# Patient Record
Sex: MALE | Race: WHITE | NOT HISPANIC OR LATINO | Employment: FULL TIME | URBAN - METROPOLITAN AREA
[De-identification: names, ages, dates, MRNs, and addresses within clinical notes are randomized per-mention and may not be internally consistent; named-entity substitution may affect disease eponyms.]

---

## 2024-05-03 ENCOUNTER — OFFICE VISIT (OUTPATIENT)
Dept: INTERNAL MEDICINE | Facility: CLINIC | Age: 29
End: 2024-05-03
Payer: COMMERCIAL

## 2024-05-03 ENCOUNTER — LAB (OUTPATIENT)
Dept: LAB | Facility: HOSPITAL | Age: 29
End: 2024-05-03
Payer: COMMERCIAL

## 2024-05-03 VITALS
OXYGEN SATURATION: 98 % | HEART RATE: 74 BPM | SYSTOLIC BLOOD PRESSURE: 122 MMHG | HEIGHT: 73 IN | DIASTOLIC BLOOD PRESSURE: 78 MMHG | TEMPERATURE: 97.5 F | BODY MASS INDEX: 22.13 KG/M2 | WEIGHT: 167 LBS

## 2024-05-03 DIAGNOSIS — Z11.59 NEED FOR HEPATITIS C SCREENING TEST: ICD-10-CM

## 2024-05-03 DIAGNOSIS — Z13.0 SCREENING FOR ENDOCRINE, NUTRITIONAL, METABOLIC AND IMMUNITY DISORDER: ICD-10-CM

## 2024-05-03 DIAGNOSIS — Z76.89 ENCOUNTER TO ESTABLISH CARE WITH NEW DOCTOR: ICD-10-CM

## 2024-05-03 DIAGNOSIS — Z13.228 SCREENING FOR ENDOCRINE, NUTRITIONAL, METABOLIC AND IMMUNITY DISORDER: ICD-10-CM

## 2024-05-03 DIAGNOSIS — Z13.220 SCREENING, LIPID: ICD-10-CM

## 2024-05-03 DIAGNOSIS — Z13.21 SCREENING FOR ENDOCRINE, NUTRITIONAL, METABOLIC AND IMMUNITY DISORDER: ICD-10-CM

## 2024-05-03 DIAGNOSIS — Z00.00 WELLNESS EXAMINATION: Primary | ICD-10-CM

## 2024-05-03 DIAGNOSIS — Z13.29 SCREENING FOR ENDOCRINE, NUTRITIONAL, METABOLIC AND IMMUNITY DISORDER: ICD-10-CM

## 2024-05-03 PROBLEM — J30.2 SEASONAL ALLERGIES: Status: ACTIVE | Noted: 2024-05-03

## 2024-05-03 LAB
DEPRECATED RDW RBC AUTO: 41.6 FL (ref 37–54)
ERYTHROCYTE [DISTWIDTH] IN BLOOD BY AUTOMATED COUNT: 12.2 % (ref 12.3–15.4)
HCT VFR BLD AUTO: 43.9 % (ref 37.5–51)
HGB BLD-MCNC: 15.1 G/DL (ref 13–17.7)
MCH RBC QN AUTO: 31.7 PG (ref 26.6–33)
MCHC RBC AUTO-ENTMCNC: 34.4 G/DL (ref 31.5–35.7)
MCV RBC AUTO: 92.2 FL (ref 79–97)
PLATELET # BLD AUTO: 221 10*3/MM3 (ref 140–450)
PMV BLD AUTO: 9.6 FL (ref 6–12)
RBC # BLD AUTO: 4.76 10*6/MM3 (ref 4.14–5.8)
WBC NRBC COR # BLD AUTO: 9.07 10*3/MM3 (ref 3.4–10.8)

## 2024-05-03 PROCEDURE — 80053 COMPREHEN METABOLIC PANEL: CPT

## 2024-05-03 PROCEDURE — 80061 LIPID PANEL: CPT

## 2024-05-03 PROCEDURE — 84443 ASSAY THYROID STIM HORMONE: CPT

## 2024-05-03 PROCEDURE — 85027 COMPLETE CBC AUTOMATED: CPT

## 2024-05-03 PROCEDURE — 86803 HEPATITIS C AB TEST: CPT

## 2024-05-03 RX ORDER — CETIRIZINE HYDROCHLORIDE 10 MG/1
10 TABLET ORAL DAILY
COMMUNITY

## 2024-05-03 NOTE — PROGRESS NOTES
Office Note     Name: Malik Ornelas    : 1995     MRN: [id number]     Chief Complaint  Annual Exam and Establish Care    Subjective     History of Present Illness:  Malik Ornelas is a 28 y.o. male who presents today to establish care, physical   Is currently fasting   No issues or concerns     PMH-  Season allergies     [facility]-  Zyrtec     Family Hx-   Maternal GM- - COPD- smoker, stroke   Maternal GF- - unsure   Paternal GM- alive- unsure   Paternal GF- - unsure   Mother- alive- healthy   Father- alive- smoker   Half-Sister- alive   Half- Sister- alive   Half- Brother- alive     Alcohol - 4-5 per week   Smoking - none   Drug use - none   Job -    Stress - 5/10    Sun Exposure - protects skin- no dermatology     Dental/Eye exams - will schedule. Vision is stable. - no glasses or contacts   Diet/Exercise- Exercise- stays active.   Diet- Healthy- moderate. Salty- snacks     Colonoscopy/Cologuard - not. Will start at 47.     Immunizations  Tdap- will hold - will check record   Flu- postponed   COVID- completed no recent booster     Review of Systems:   Review of Systems   Constitutional:  Negative for chills and fever.   HENT:  Negative for dental problem, trouble swallowing and voice change.    Eyes:  Negative for visual disturbance.   Respiratory:  Negative for cough, chest tightness, shortness of breath and wheezing.    Cardiovascular:  Negative for chest pain, palpitations and leg swelling.   Gastrointestinal:  Negative for abdominal pain, constipation, diarrhea, nausea, vomiting and GERD.   Genitourinary:  Negative for dysuria.   Musculoskeletal:  Negative for gait problem.   Skin:  Negative for rash.   Neurological:  Negative for dizziness, seizures, syncope, speech difficulty, weakness, light-headedness and headache.   Psychiatric/Behavioral:  Negative for dysphoric mood.        Past Medical History: History reviewed. No pertinent past medical  "history.    Past Surgical History: History reviewed. No pertinent surgical history.    Immunizations:   Immunization History   Administered Date(s) Administered    COVID-19 (PFIZER) Purple Cap Monovalent 05/19/2021, 06/21/2021        Medications:     Current Outpatient Medications:     cetirizine (zyrTEC) 10 MG tablet, Take 1 tablet by mouth Daily., Disp: , Rfl:     Allergies:   No Known Allergies    Family History:   Family History   Problem Relation Age of Onset    COPD Maternal Grandmother         smoker    Stroke Maternal Grandmother        Social History:   Social History     Socioeconomic History    Marital status: Single   Tobacco Use    Smoking status: Never    Smokeless tobacco: Never   Vaping Use    Vaping status: Never Used   Substance and Sexual Activity    Alcohol use: Yes     Alcohol/week: 4.0 - 5.0 standard drinks of alcohol     Types: 4 - 5 Standard drinks or equivalent per week    Drug use: Never    Sexual activity: Yes         Objective     Vital Signs  /78   Pulse 74   Temp 97.5 °F (36.4 °C)   Ht 185.4 cm (73\")   Wt 75.8 kg (167 lb)   SpO2 98%   BMI 22.03 kg/m²   Estimated body mass index is 22.03 kg/m² as calculated from the following:    Height as of this encounter: 185.4 cm (73\").    Weight as of this encounter: 75.8 kg (167 lb).    BMI is within normal parameters. No other follow-up for BMI required.      Physical Exam  Vitals and nursing note reviewed.   Constitutional:       General: He is not in acute distress.     Appearance: Normal appearance.   HENT:      Head: Normocephalic and atraumatic.      Right Ear: Tympanic membrane normal.      Left Ear: Tympanic membrane normal.      Nose: Nose normal.      Mouth/Throat:      Mouth: Mucous membranes are moist.   Eyes:      Extraocular Movements: Extraocular movements intact.      Conjunctiva/sclera: Conjunctivae normal.      Pupils: Pupils are equal, round, and reactive to light.   Cardiovascular:      Rate and Rhythm: Normal rate " and regular rhythm.      Heart sounds: Normal heart sounds.   Pulmonary:      Effort: Pulmonary effort is normal. No respiratory distress.      Breath sounds: Normal breath sounds.   Abdominal:      General: Bowel sounds are normal.      Palpations: Abdomen is soft.   Musculoskeletal:         General: Normal range of motion.      Cervical back: Normal range of motion.      Comments: Moving all extremities    Skin:     General: Skin is warm and dry.   Neurological:      General: No focal deficit present.      Mental Status: He is alert and oriented to person, place, and time.   Psychiatric:         Mood and Affect: Mood normal.         Behavior: Behavior normal.         Thought Content: Thought content normal.         Judgment: Judgment normal.          Procedures     Assessment and Plan   Diagnosis Discussed   Continue to monitor   Plenty of fluids, monitor diet and exercise   Labs ordered will notify of results   Immunizations up to date -will check record for Tdap  Take medications as instructed  Follow up as directed   If symptoms worsen or persist please seek further evaluation     1. Wellness examination    2. Encounter to establish care with new doctor  Reviewed PMH, medications and specialists     3. Screening for endocrine, nutritional, metabolic and immunity disorder  - CBC (No Diff); Future  - Comprehensive Metabolic Panel; Future  - TSH Rfx On Abnormal To Free T4; Future    4. Screening, lipid  - Lipid Panel; Future    5. Need for hepatitis C screening test  - Hepatitis C Antibody; Future       Follow Up  Return in about 1 year (around 5/3/2025), or if symptoms worsen or fail to improve, for Annual, fasting labs.    Laxmi Zimmer MD  [facility] INTERNAL MEDICINE  [address] [facility] [address] [phone]

## 2024-05-03 NOTE — PATIENT INSTRUCTIONS
Diagnosis Discussed   Continue to monitor   Plenty of fluids, monitor diet and exercise   Labs ordered will notify of results   Immunizations up to date -will check record for Tdap  Take medications as instructed  Follow up as directed   If symptoms worsen or persist please seek further evaluation

## 2024-05-04 LAB
ALBUMIN SERPL-MCNC: 4.8 G/DL (ref 3.5–5.2)
ALBUMIN/GLOB SERPL: 1.9 G/DL
ALP SERPL-CCNC: 61 U/L (ref 39–117)
ALT SERPL W P-5'-P-CCNC: 23 U/L (ref 1–41)
ANION GAP SERPL CALCULATED.3IONS-SCNC: 10 MMOL/L (ref 5–15)
AST SERPL-CCNC: 22 U/L (ref 1–40)
BILIRUB SERPL-MCNC: 0.6 MG/DL (ref 0–1.2)
BUN SERPL-MCNC: 12 MG/DL (ref 6–20)
BUN/CREAT SERPL: 13 (ref 7–25)
CALCIUM SPEC-SCNC: 9.4 MG/DL (ref 8.6–10.5)
CHLORIDE SERPL-SCNC: 106 MMOL/L (ref 98–107)
CHOLEST SERPL-MCNC: 203 MG/DL (ref 0–200)
CO2 SERPL-SCNC: 26 MMOL/L (ref 22–29)
CREAT SERPL-MCNC: 0.92 MG/DL (ref 0.76–1.27)
EGFRCR SERPLBLD CKD-EPI 2021: 117.7 ML/MIN/1.73
GLOBULIN UR ELPH-MCNC: 2.5 GM/DL
GLUCOSE SERPL-MCNC: 90 MG/DL (ref 65–99)
HCV AB SER QL: NORMAL
HDLC SERPL-MCNC: 54 MG/DL (ref 40–60)
LDLC SERPL CALC-MCNC: 130 MG/DL (ref 0–100)
LDLC/HDLC SERPL: 2.36 {RATIO}
POTASSIUM SERPL-SCNC: 4.4 MMOL/L (ref 3.5–5.2)
PROT SERPL-MCNC: 7.3 G/DL (ref 6–8.5)
SODIUM SERPL-SCNC: 142 MMOL/L (ref 136–145)
TRIGL SERPL-MCNC: 107 MG/DL (ref 0–150)
TSH SERPL DL<=0.05 MIU/L-ACNC: 1.02 UIU/ML (ref 0.27–4.2)
VLDLC SERPL-MCNC: 19 MG/DL (ref 5–40)

## 2024-05-06 ENCOUNTER — TELEPHONE (OUTPATIENT)
Dept: INTERNAL MEDICINE | Facility: CLINIC | Age: 29
End: 2024-05-06
Payer: COMMERCIAL

## 2024-05-06 PROBLEM — E78.2 MIXED HYPERLIPIDEMIA: Status: ACTIVE | Noted: 2024-05-06

## 2024-05-07 ENCOUNTER — TELEPHONE (OUTPATIENT)
Dept: INTERNAL MEDICINE | Facility: CLINIC | Age: 29
End: 2024-05-07
Payer: COMMERCIAL

## 2024-05-10 ENCOUNTER — PATIENT ROUNDING (BHMG ONLY) (OUTPATIENT)
Dept: INTERNAL MEDICINE | Facility: CLINIC | Age: 29
End: 2024-05-10
Payer: COMMERCIAL

## 2024-12-27 ENCOUNTER — OFFICE VISIT (OUTPATIENT)
Dept: INTERNAL MEDICINE | Facility: CLINIC | Age: 29
End: 2024-12-27
Payer: COMMERCIAL

## 2024-12-27 VITALS
OXYGEN SATURATION: 100 % | HEART RATE: 80 BPM | WEIGHT: 178 LBS | TEMPERATURE: 97.5 F | HEIGHT: 73 IN | BODY MASS INDEX: 23.59 KG/M2 | RESPIRATION RATE: 16 BRPM | DIASTOLIC BLOOD PRESSURE: 80 MMHG | SYSTOLIC BLOOD PRESSURE: 118 MMHG

## 2024-12-27 DIAGNOSIS — U07.1 COVID-19 VIRUS INFECTION: Primary | ICD-10-CM

## 2024-12-27 DIAGNOSIS — R05.1 ACUTE COUGH: ICD-10-CM

## 2024-12-27 LAB
EXPIRATION DATE: ABNORMAL
FLUAV AG UPPER RESP QL IA.RAPID: NOT DETECTED
FLUBV AG UPPER RESP QL IA.RAPID: NOT DETECTED
INTERNAL CONTROL: ABNORMAL
Lab: ABNORMAL
SARS-COV-2 AG UPPER RESP QL IA.RAPID: DETECTED

## 2024-12-27 PROCEDURE — 99213 OFFICE O/P EST LOW 20 MIN: CPT | Performed by: FAMILY MEDICINE

## 2024-12-27 PROCEDURE — 87428 SARSCOV & INF VIR A&B AG IA: CPT | Performed by: FAMILY MEDICINE

## 2024-12-27 NOTE — PROGRESS NOTES
Office Note     Name: Malik Ornelas    : 1995     MRN: [id number]     Chief Complaint  Exposure To Known Illness (Pt states exposed to covid on 26)    Subjective     History of Present Illness:  Malik Ornelas is a 29 y.o. male who presents today for exposed to COVID on      Sore throat today   Chest tightness yesterday     Denies fevers, chills, abdominal pain, diarrhea, constipation, nausea or vomiting   Dry cough- non productive  Mild sob     OTC- nothing     Review of Systems:   Review of Systems   Constitutional:  Negative for chills and fever.   HENT:  Positive for postnasal drip and sore throat. Negative for congestion, trouble swallowing and voice change.    Respiratory:  Positive for chest tightness. Negative for cough, shortness of breath and wheezing.    Cardiovascular:  Negative for chest pain, palpitations and leg swelling.   Gastrointestinal:  Negative for abdominal pain, constipation, diarrhea, nausea and vomiting.   Genitourinary:  Negative for dysuria.   Musculoskeletal:  Negative for gait problem.   Neurological:  Negative for light-headedness and headache.       Past Medical History: History reviewed. No pertinent past medical history.    Past Surgical History: History reviewed. No pertinent surgical history.    Immunizations:   Immunization History   Administered Date(s) Administered    COVID-19 (PFIZER) Purple Cap Monovalent 2021, 2021        Medications:     Current Outpatient Medications:     cetirizine (zyrTEC) 10 MG tablet, Take 1 tablet by mouth Daily., Disp: , Rfl:     Allergies:   No Known Allergies    Family History:   Family History   Problem Relation Age of Onset    COPD Maternal Grandmother         smoker    Stroke Maternal Grandmother        Social History:   Social History     Socioeconomic History    Marital status: Single   Tobacco Use    Smoking status: Never    Smokeless tobacco: Never   Vaping Use    Vaping status: Never Used  "  Substance and Sexual Activity    Alcohol use: Yes     Alcohol/week: 4.0 - 5.0 standard drinks of alcohol     Types: 4 - 5 Standard drinks or equivalent per week    Drug use: Never    Sexual activity: Yes         Objective     Vital Signs  /80   Pulse 80   Temp 97.5 °F (36.4 °C)   Resp 16   Ht 185.4 cm (73\")   Wt 80.7 kg (178 lb)   SpO2 100%   BMI 23.48 kg/m²   Estimated body mass index is 23.48 kg/m² as calculated from the following:    Height as of this encounter: 185.4 cm (73\").    Weight as of this encounter: 80.7 kg (178 lb).    BMI is within normal parameters. No other follow-up for BMI required.      Physical Exam  Vitals and nursing note reviewed.   Constitutional:       General: He is not in acute distress.     Appearance: Normal appearance.   HENT:      Head: Normocephalic and atraumatic.      Right Ear: Tympanic membrane normal.      Left Ear: Tympanic membrane normal.      Nose: Nose normal. Congestion present.      Mouth/Throat:      Mouth: Mucous membranes are moist.      Comments: Clear post nasal drip   Cardiovascular:      Rate and Rhythm: Normal rate and regular rhythm.      Heart sounds: Normal heart sounds.   Pulmonary:      Effort: Pulmonary effort is normal. No respiratory distress.      Breath sounds: Normal breath sounds.   Skin:     General: Skin is warm and dry.   Neurological:      General: No focal deficit present.      Mental Status: He is alert and oriented to person, place, and time.          Procedures     Assessment and Plan   Diagnosis Discussed   Continue to monitor   Plenty of fluids and rest   Tylenol and/or Ibuprofen as needed   Mucinex as needed as directed- sinus congestion   If symptoms worsen or persist please seek further evaluation     1. COVID-19 virus infection    2. Acute cough  - POCT SARS-CoV-2 Antigen BLANCA + Flu       Follow Up  Return if symptoms worsen or fail to improve, for Next scheduled follow up.    Laxmi Zimmer MD  [facility] " [facility] MEDICAL [facility] INTERNAL MEDICINE  [facility] [facility] [id number] [phone]

## 2024-12-27 NOTE — LETTER
December 27, 2024     Patient: Malik Ornelas   YOB: 1995   Date of Visit: 12/27/2024       To Whom It May Concern:    It is my medical opinion that Malik Ornelas may return work on 1/31/2024 with mask for additional 5 days. COVID + testing.          Sincerely,        Laxmi Zimmer MD    CC: No Recipients

## 2024-12-27 NOTE — PATIENT INSTRUCTIONS
Diagnosis Discussed   Continue to monitor   Plenty of fluids and rest   Tylenol and/or Ibuprofen as needed   Mucinex as needed as directed- sinus congestion   If symptoms worsen or persist please seek further evaluation

## 2025-02-14 ENCOUNTER — OFFICE VISIT (OUTPATIENT)
Dept: INTERNAL MEDICINE | Facility: CLINIC | Age: 30
End: 2025-02-14
Payer: COMMERCIAL

## 2025-02-14 VITALS
DIASTOLIC BLOOD PRESSURE: 72 MMHG | SYSTOLIC BLOOD PRESSURE: 110 MMHG | WEIGHT: 175 LBS | BODY MASS INDEX: 23.19 KG/M2 | RESPIRATION RATE: 14 BRPM | HEIGHT: 73 IN | HEART RATE: 88 BPM | OXYGEN SATURATION: 96 %

## 2025-02-14 DIAGNOSIS — Z13.220 SCREENING, LIPID: ICD-10-CM

## 2025-02-14 DIAGNOSIS — Z13.21 SCREENING FOR ENDOCRINE, NUTRITIONAL, METABOLIC AND IMMUNITY DISORDER: ICD-10-CM

## 2025-02-14 DIAGNOSIS — Z13.228 SCREENING FOR ENDOCRINE, NUTRITIONAL, METABOLIC AND IMMUNITY DISORDER: ICD-10-CM

## 2025-02-14 DIAGNOSIS — Z13.29 SCREENING FOR ENDOCRINE, NUTRITIONAL, METABOLIC AND IMMUNITY DISORDER: ICD-10-CM

## 2025-02-14 DIAGNOSIS — Z00.00 WELLNESS EXAMINATION: Primary | ICD-10-CM

## 2025-02-14 DIAGNOSIS — Z13.0 SCREENING FOR ENDOCRINE, NUTRITIONAL, METABOLIC AND IMMUNITY DISORDER: ICD-10-CM

## 2025-02-14 PROCEDURE — 99395 PREV VISIT EST AGE 18-39: CPT | Performed by: FAMILY MEDICINE

## 2025-02-14 NOTE — PROGRESS NOTES
Office Note     Name: Malik Ornelas    : 1995     MRN: [id number]     Chief Complaint  Annual Exam    Subjective     History of Present Illness:  Malik Ornelas is a 29 y.o. male who presents today for physical     PMH-  Season allergies   HPL    Meds-  Zyrtec as needed      Family Hx-   Maternal GM- - COPD- smoker, stroke   Maternal GF- - unsure   Paternal GM- alive- unsure   Paternal GF- - unsure   Mother- alive- healthy   Father- alive- smoker   Half-Sister- alive   Half- Sister- alive   Half- Brother- alive      Alcohol - 2-3  per week   Smoking - none   Drug use - none   Job -    Stress - 5/10    Sun Exposure - protects skin- no dermatology      Dental/Eye exams - up to date. Vision is stable. - no glasses or contacts   Diet/Exercise- Exercise- stays active. 4 days per week    Diet- Healthy- moderate. Salty- snacks - making healthier snack decisions      Colonoscopy/Cologuard - not. Will start at 47.      Immunizations  Tdap-will hold   Flu- declined   COVID- completed no recent booster     Review of Systems:   Review of Systems   Constitutional:  Negative for chills and fever.   HENT:  Negative for dental problem, trouble swallowing and voice change.    Eyes:  Negative for visual disturbance.   Respiratory:  Negative for cough, chest tightness, shortness of breath and wheezing.    Cardiovascular:  Negative for chest pain, palpitations and leg swelling.   Gastrointestinal:  Negative for abdominal pain, blood in stool, constipation, diarrhea, nausea and vomiting.   Genitourinary:  Negative for dysuria.   Musculoskeletal:  Negative for gait problem.   Skin:  Negative for rash.   Neurological:  Negative for light-headedness and headache.   Psychiatric/Behavioral:  Negative for dysphoric mood.        Past Medical History: History reviewed. No pertinent past medical history.    Past Surgical History: History reviewed. No pertinent surgical  "history.    Immunizations:   Immunization History   Administered Date(s) Administered    COVID-19 (PFIZER) Purple Cap Monovalent 05/19/2021, 06/21/2021        Medications:   No current outpatient medications on file.    Allergies:   No Known Allergies    Family History:   Family History   Problem Relation Age of Onset    COPD Maternal Grandmother         smoker    Stroke Maternal Grandmother        Social History:   Social History     Socioeconomic History    Marital status: Single   Tobacco Use    Smoking status: Never    Smokeless tobacco: Never   Vaping Use    Vaping status: Never Used   Substance and Sexual Activity    Alcohol use: Yes     Alcohol/week: 2.0 - 3.0 standard drinks of alcohol     Types: 2 - 3 Standard drinks or equivalent per week    Drug use: Never    Sexual activity: Yes         Objective     Vital Signs  /72   Pulse 88   Resp 14   Ht 185.4 cm (73\")   Wt 79.4 kg (175 lb)   SpO2 96%   BMI 23.09 kg/m²   Estimated body mass index is 23.09 kg/m² as calculated from the following:    Height as of this encounter: 185.4 cm (73\").    Weight as of this encounter: 79.4 kg (175 lb).    BMI is within normal parameters. No other follow-up for BMI required.      Physical Exam  Vitals and nursing note reviewed.   Constitutional:       General: He is not in acute distress.     Appearance: Normal appearance.   HENT:      Head: Normocephalic and atraumatic.      Right Ear: Tympanic membrane normal.      Left Ear: Tympanic membrane normal.      Nose: Nose normal.      Mouth/Throat:      Mouth: Mucous membranes are moist.   Eyes:      Extraocular Movements: Extraocular movements intact.      Conjunctiva/sclera: Conjunctivae normal.      Pupils: Pupils are equal, round, and reactive to light.   Cardiovascular:      Rate and Rhythm: Normal rate and regular rhythm.      Heart sounds: Normal heart sounds.   Pulmonary:      Effort: Pulmonary effort is normal. No respiratory distress.      Breath sounds: Normal " breath sounds.   Abdominal:      General: Bowel sounds are normal.      Palpations: Abdomen is soft.   Musculoskeletal:         General: Normal range of motion.      Cervical back: Normal range of motion.      Comments: Moving all extremities    Skin:     General: Skin is warm and dry.   Neurological:      General: No focal deficit present.      Mental Status: He is alert and oriented to person, place, and time.   Psychiatric:         Mood and Affect: Mood normal.         Behavior: Behavior normal.         Thought Content: Thought content normal.         Judgment: Judgment normal.          Procedures     Assessment and Plan   Diagnosis Discussed   Continue to monitor   Plenty of fluids, monitor diet and exercise   Labs ordered will notify of results   Immunizations up to date   Take medications as instructed  Follow up as directed   If symptoms worsen or persist please seek further evaluation     1. Wellness examination    2. Screening for endocrine, nutritional, metabolic and immunity disorder  - CBC (No Diff); Future  - Comprehensive Metabolic Panel; Future  - Hemoglobin A1c; Future  - TSH Rfx On Abnormal To Free T4; Future    3. Screening, lipid  - Lipid Panel; Future       Follow Up  Return in about 1 year (around 2/14/2026), or if symptoms worsen or fail to improve, for Annual, fasting labs.    Laxmi Zimmer MD  [facility] INTERNAL MEDICINE  [address] [facility] [address] [phone]

## 2025-02-14 NOTE — PATIENT INSTRUCTIONS
Diagnosis Discussed   Continue to monitor   Plenty of fluids, monitor diet and exercise   Labs ordered will notify of results   Immunizations up to date   Take medications as instructed  Follow up as directed   If symptoms worsen or persist please seek further evaluation

## 2025-08-22 ENCOUNTER — OFFICE VISIT (OUTPATIENT)
Dept: INTERNAL MEDICINE | Facility: CLINIC | Age: 30
End: 2025-08-22
Payer: COMMERCIAL

## 2025-08-22 VITALS
HEART RATE: 81 BPM | OXYGEN SATURATION: 98 % | DIASTOLIC BLOOD PRESSURE: 82 MMHG | WEIGHT: 167 LBS | BODY MASS INDEX: 22.13 KG/M2 | SYSTOLIC BLOOD PRESSURE: 120 MMHG | HEIGHT: 73 IN

## 2025-08-22 DIAGNOSIS — F41.1 GENERALIZED ANXIETY DISORDER: ICD-10-CM

## 2025-08-22 DIAGNOSIS — R07.89 SENSATION OF CHEST TIGHTNESS: ICD-10-CM

## 2025-08-22 DIAGNOSIS — F43.0 STRESS REACTION: Primary | ICD-10-CM

## 2025-08-22 PROCEDURE — 99213 OFFICE O/P EST LOW 20 MIN: CPT | Performed by: FAMILY MEDICINE
